# Patient Record
Sex: MALE | Race: WHITE | NOT HISPANIC OR LATINO | Employment: OTHER | URBAN - METROPOLITAN AREA
[De-identification: names, ages, dates, MRNs, and addresses within clinical notes are randomized per-mention and may not be internally consistent; named-entity substitution may affect disease eponyms.]

---

## 2022-04-11 ENCOUNTER — APPOINTMENT (OUTPATIENT)
Dept: CARDIOLOGY | Facility: CLINIC | Age: 72
End: 2022-04-11
Attending: INTERNAL MEDICINE
Payer: MEDICARE

## 2022-04-11 ENCOUNTER — HOSPITAL ENCOUNTER (OUTPATIENT)
Facility: CLINIC | Age: 72
Setting detail: OBSERVATION
Discharge: HOME | End: 2022-04-12
Attending: EMERGENCY MEDICINE | Admitting: INTERNAL MEDICINE
Payer: MEDICARE

## 2022-04-11 ENCOUNTER — APPOINTMENT (OUTPATIENT)
Dept: CT IMAGING | Facility: CLINIC | Age: 72
End: 2022-04-11
Attending: EMERGENCY MEDICINE
Payer: MEDICARE

## 2022-04-11 DIAGNOSIS — R55 SYNCOPE, UNSPECIFIED SYNCOPE TYPE: ICD-10-CM

## 2022-04-11 LAB
ALBUMIN SERPL-MCNC: 3.3 G/DL (ref 3.4–5)
ALBUMIN UR-MCNC: 20 MG/DL
ALP SERPL-CCNC: 73 U/L (ref 40–150)
ALT SERPL W P-5'-P-CCNC: 22 U/L (ref 0–70)
ANION GAP SERPL CALCULATED.3IONS-SCNC: 5 MMOL/L (ref 3–14)
APPEARANCE UR: CLEAR
AST SERPL W P-5'-P-CCNC: 22 U/L (ref 0–45)
BASOPHILS # BLD AUTO: 0.1 10E3/UL (ref 0–0.2)
BASOPHILS NFR BLD AUTO: 1 %
BILIRUB SERPL-MCNC: 0.6 MG/DL (ref 0.2–1.3)
BILIRUB UR QL STRIP: NEGATIVE
BUN SERPL-MCNC: 16 MG/DL (ref 7–30)
CALCIUM SERPL-MCNC: 8.4 MG/DL (ref 8.5–10.1)
CHLORIDE BLD-SCNC: 108 MMOL/L (ref 94–109)
CO2 SERPL-SCNC: 26 MMOL/L (ref 20–32)
COLOR UR AUTO: YELLOW
CREAT SERPL-MCNC: 1.16 MG/DL (ref 0.66–1.25)
EOSINOPHIL # BLD AUTO: 0.3 10E3/UL (ref 0–0.7)
EOSINOPHIL NFR BLD AUTO: 5 %
ERYTHROCYTE [DISTWIDTH] IN BLOOD BY AUTOMATED COUNT: 17.2 % (ref 10–15)
GFR SERPL CREATININE-BSD FRML MDRD: 67 ML/MIN/1.73M2
GLUCOSE BLD-MCNC: 147 MG/DL (ref 70–99)
GLUCOSE UR STRIP-MCNC: NEGATIVE MG/DL
HCT VFR BLD AUTO: 41.2 % (ref 40–53)
HGB BLD-MCNC: 12.4 G/DL (ref 13.3–17.7)
HGB UR QL STRIP: NEGATIVE
IMM GRANULOCYTES # BLD: 0.1 10E3/UL
IMM GRANULOCYTES NFR BLD: 1 %
INR PPP: 2.45 (ref 0.85–1.15)
KETONES UR STRIP-MCNC: NEGATIVE MG/DL
LEUKOCYTE ESTERASE UR QL STRIP: NEGATIVE
LVEF ECHO: NORMAL
LYMPHOCYTES # BLD AUTO: 0.6 10E3/UL (ref 0.8–5.3)
LYMPHOCYTES NFR BLD AUTO: 12 %
MAGNESIUM SERPL-MCNC: 2.3 MG/DL (ref 1.6–2.3)
MCH RBC QN AUTO: 24.8 PG (ref 26.5–33)
MCHC RBC AUTO-ENTMCNC: 30.1 G/DL (ref 31.5–36.5)
MCV RBC AUTO: 82 FL (ref 78–100)
MONOCYTES # BLD AUTO: 0.4 10E3/UL (ref 0–1.3)
MONOCYTES NFR BLD AUTO: 7 %
MUCOUS THREADS #/AREA URNS LPF: PRESENT /LPF
NEUTROPHILS # BLD AUTO: 3.9 10E3/UL (ref 1.6–8.3)
NEUTROPHILS NFR BLD AUTO: 74 %
NITRATE UR QL: NEGATIVE
NRBC # BLD AUTO: 0 10E3/UL
NRBC BLD AUTO-RTO: 0 /100
PH UR STRIP: 6 [PH] (ref 5–7)
PLATELET # BLD AUTO: 189 10E3/UL (ref 150–450)
POTASSIUM BLD-SCNC: 4.1 MMOL/L (ref 3.4–5.3)
PROT SERPL-MCNC: 6.2 G/DL (ref 6.8–8.8)
RBC # BLD AUTO: 5 10E6/UL (ref 4.4–5.9)
RBC URINE: 1 /HPF
SARS-COV-2 RNA RESP QL NAA+PROBE: NEGATIVE
SODIUM SERPL-SCNC: 139 MMOL/L (ref 133–144)
SP GR UR STRIP: 1.02 (ref 1–1.03)
SQUAMOUS EPITHELIAL: <1 /HPF
TROPONIN I SERPL HS-MCNC: 12 NG/L
UROBILINOGEN UR STRIP-MCNC: NORMAL MG/DL
WBC # BLD AUTO: 5.3 10E3/UL (ref 4–11)
WBC URINE: 2 /HPF

## 2022-04-11 PROCEDURE — 999N000208 ECHOCARDIOGRAM COMPLETE

## 2022-04-11 PROCEDURE — C9803 HOPD COVID-19 SPEC COLLECT: HCPCS

## 2022-04-11 PROCEDURE — 84484 ASSAY OF TROPONIN QUANT: CPT | Performed by: EMERGENCY MEDICINE

## 2022-04-11 PROCEDURE — 258N000003 HC RX IP 258 OP 636: Performed by: INTERNAL MEDICINE

## 2022-04-11 PROCEDURE — G0378 HOSPITAL OBSERVATION PER HR: HCPCS

## 2022-04-11 PROCEDURE — 99285 EMERGENCY DEPT VISIT HI MDM: CPT | Mod: 25

## 2022-04-11 PROCEDURE — U0003 INFECTIOUS AGENT DETECTION BY NUCLEIC ACID (DNA OR RNA); SEVERE ACUTE RESPIRATORY SYNDROME CORONAVIRUS 2 (SARS-COV-2) (CORONAVIRUS DISEASE [COVID-19]), AMPLIFIED PROBE TECHNIQUE, MAKING USE OF HIGH THROUGHPUT TECHNOLOGIES AS DESCRIBED BY CMS-2020-01-R: HCPCS | Performed by: EMERGENCY MEDICINE

## 2022-04-11 PROCEDURE — 250N000013 HC RX MED GY IP 250 OP 250 PS 637: Performed by: INTERNAL MEDICINE

## 2022-04-11 PROCEDURE — 96361 HYDRATE IV INFUSION ADD-ON: CPT

## 2022-04-11 PROCEDURE — 70450 CT HEAD/BRAIN W/O DYE: CPT

## 2022-04-11 PROCEDURE — 83735 ASSAY OF MAGNESIUM: CPT | Performed by: INTERNAL MEDICINE

## 2022-04-11 PROCEDURE — 93005 ELECTROCARDIOGRAM TRACING: CPT

## 2022-04-11 PROCEDURE — 96360 HYDRATION IV INFUSION INIT: CPT | Mod: 59

## 2022-04-11 PROCEDURE — 255N000002 HC RX 255 OP 636: Performed by: INTERNAL MEDICINE

## 2022-04-11 PROCEDURE — 80053 COMPREHEN METABOLIC PANEL: CPT | Performed by: EMERGENCY MEDICINE

## 2022-04-11 PROCEDURE — 81001 URINALYSIS AUTO W/SCOPE: CPT | Performed by: EMERGENCY MEDICINE

## 2022-04-11 PROCEDURE — 85025 COMPLETE CBC W/AUTO DIFF WBC: CPT | Performed by: EMERGENCY MEDICINE

## 2022-04-11 PROCEDURE — 93306 TTE W/DOPPLER COMPLETE: CPT | Mod: 26 | Performed by: INTERNAL MEDICINE

## 2022-04-11 PROCEDURE — 85610 PROTHROMBIN TIME: CPT | Performed by: EMERGENCY MEDICINE

## 2022-04-11 PROCEDURE — 99220 PR INITIAL OBSERVATION CARE,LEVEL III: CPT | Performed by: INTERNAL MEDICINE

## 2022-04-11 PROCEDURE — 93005 ELECTROCARDIOGRAM TRACING: CPT | Mod: 76

## 2022-04-11 PROCEDURE — 258N000003 HC RX IP 258 OP 636: Performed by: EMERGENCY MEDICINE

## 2022-04-11 PROCEDURE — 36415 COLL VENOUS BLD VENIPUNCTURE: CPT | Performed by: EMERGENCY MEDICINE

## 2022-04-11 RX ORDER — PROCHLORPERAZINE MALEATE 5 MG
5 TABLET ORAL EVERY 6 HOURS PRN
Status: DISCONTINUED | OUTPATIENT
Start: 2022-04-11 | End: 2022-04-12 | Stop reason: HOSPADM

## 2022-04-11 RX ORDER — ACETAMINOPHEN 650 MG/1
650 SUPPOSITORY RECTAL EVERY 4 HOURS PRN
Status: DISCONTINUED | OUTPATIENT
Start: 2022-04-11 | End: 2022-04-12 | Stop reason: HOSPADM

## 2022-04-11 RX ORDER — ACETAMINOPHEN 325 MG/1
650 TABLET ORAL EVERY 4 HOURS PRN
Status: DISCONTINUED | OUTPATIENT
Start: 2022-04-11 | End: 2022-04-12 | Stop reason: HOSPADM

## 2022-04-11 RX ORDER — NITROGLYCERIN 0.4 MG/1
0.4 TABLET SUBLINGUAL EVERY 5 MIN PRN
Status: DISCONTINUED | OUTPATIENT
Start: 2022-04-11 | End: 2022-04-12 | Stop reason: HOSPADM

## 2022-04-11 RX ORDER — LOSARTAN POTASSIUM 50 MG/1
50 TABLET ORAL DAILY
Status: DISCONTINUED | OUTPATIENT
Start: 2022-04-11 | End: 2022-04-12 | Stop reason: HOSPADM

## 2022-04-11 RX ORDER — WARFARIN SODIUM 5 MG/1
5 TABLET ORAL
Status: COMPLETED | OUTPATIENT
Start: 2022-04-11 | End: 2022-04-11

## 2022-04-11 RX ORDER — TAMSULOSIN HYDROCHLORIDE 0.4 MG/1
0.4 CAPSULE ORAL DAILY
Status: ON HOLD | COMMUNITY
End: 2022-04-12

## 2022-04-11 RX ORDER — LIDOCAINE 40 MG/G
CREAM TOPICAL
Status: DISCONTINUED | OUTPATIENT
Start: 2022-04-11 | End: 2022-04-12 | Stop reason: HOSPADM

## 2022-04-11 RX ORDER — AMLODIPINE BESYLATE 10 MG/1
10 TABLET ORAL DAILY
COMMUNITY
End: 2022-04-11

## 2022-04-11 RX ORDER — PROCHLORPERAZINE 25 MG
12.5 SUPPOSITORY, RECTAL RECTAL EVERY 12 HOURS PRN
Status: DISCONTINUED | OUTPATIENT
Start: 2022-04-11 | End: 2022-04-12 | Stop reason: HOSPADM

## 2022-04-11 RX ORDER — ALENDRONATE SODIUM 70 MG/1
70 TABLET ORAL
COMMUNITY
Start: 2021-10-03

## 2022-04-11 RX ORDER — ONDANSETRON 4 MG/1
4 TABLET, ORALLY DISINTEGRATING ORAL EVERY 6 HOURS PRN
Status: DISCONTINUED | OUTPATIENT
Start: 2022-04-11 | End: 2022-04-12 | Stop reason: HOSPADM

## 2022-04-11 RX ORDER — AMOXICILLIN 500 MG/1
CAPSULE ORAL
COMMUNITY
Start: 2021-09-17

## 2022-04-11 RX ORDER — ATORVASTATIN CALCIUM 10 MG/1
1 TABLET, FILM COATED ORAL DAILY
COMMUNITY
Start: 2020-09-15

## 2022-04-11 RX ORDER — TAMSULOSIN HYDROCHLORIDE 0.4 MG/1
0.4 CAPSULE ORAL EVERY EVENING
Status: DISCONTINUED | OUTPATIENT
Start: 2022-04-11 | End: 2022-04-12 | Stop reason: HOSPADM

## 2022-04-11 RX ORDER — ONDANSETRON 2 MG/ML
4 INJECTION INTRAMUSCULAR; INTRAVENOUS EVERY 6 HOURS PRN
Status: DISCONTINUED | OUTPATIENT
Start: 2022-04-11 | End: 2022-04-12 | Stop reason: HOSPADM

## 2022-04-11 RX ORDER — MEMANTINE HYDROCHLORIDE 10 MG/1
10 TABLET ORAL 2 TIMES DAILY
COMMUNITY
Start: 2021-04-19

## 2022-04-11 RX ADMIN — LOSARTAN POTASSIUM 50 MG: 50 TABLET, FILM COATED ORAL at 14:19

## 2022-04-11 RX ADMIN — SODIUM CHLORIDE 1000 ML: 9 INJECTION, SOLUTION INTRAVENOUS at 10:04

## 2022-04-11 RX ADMIN — HUMAN ALBUMIN MICROSPHERES AND PERFLUTREN 9 ML: 10; .22 INJECTION, SOLUTION INTRAVENOUS at 16:11

## 2022-04-11 RX ADMIN — TAMSULOSIN HYDROCHLORIDE 0.4 MG: 0.4 CAPSULE ORAL at 19:37

## 2022-04-11 RX ADMIN — WARFARIN SODIUM 5 MG: 5 TABLET ORAL at 18:34

## 2022-04-11 RX ADMIN — SODIUM CHLORIDE 500 ML: 9 INJECTION, SOLUTION INTRAVENOUS at 15:00

## 2022-04-11 NOTE — PHARMACY-ANTICOAGULATION SERVICE
Clinical Pharmacy - Warfarin Dosing Consult     Pharmacy has been consulted to manage this patient s warfarin therapy.  Indication: Mechanical Aortic Valve Replacement  Therapy Goal: INR 2-3  Warfarin Prior to Admission: Yes  Warfarin PTA Regimen: 7.5 mg Sun-Tu-Th  5 mg ROW    INR   Date Value Ref Range Status   04/11/2022 2.45 (H) 0.85 - 1.15 Final   06/05/2012 2.4 (H)  Final     Comment:     Reference Range                     0.9-1.1  Moderate-intensity Warfarin Therapy 2.0-3.0  Higher-intensity Warfarin Therapy   3.0-4.0           Recommend warfarin 5 mg today.  Pharmacy will monitor David Kinsey daily and order warfarin doses to achieve specified goal.      Please contact pharmacy as soon as possible if the warfarin needs to be held for a procedure or if the warfarin goals change.

## 2022-04-11 NOTE — PROVIDER NOTIFICATION
MD Notification    Notified Person: MD    Notified Person Name:  Emory Barriga     Notification Date/Time: 4/11/22 at 1521h     Notification Interaction: AMCOM     Purpose of Notification: marty ARREGUIN has no active code status on epic yet.     Orders Received: Full code     Comments:

## 2022-04-11 NOTE — ED TRIAGE NOTES
Pt presents by [facility] from home where he lives with wife. Pt reports this morning he was standing in the kitchen when he began to feel dizzy and had a syncopal episode. Wife reports this is very abnormal for the patient and he does not have falls. Pt states he did not hit his head/have LOC but wife believes that he did. Pt was able to get up but wife reports he did have 2 more falls after that. Pt does take coumadin for a valve replacement. Pt arrived and pt laying on the ground, vitals stable but pt pale and sweaty. Pt found to be in a mobitz type II heart block. Glucose 144

## 2022-04-11 NOTE — PHARMACY-ADMISSION MEDICATION HISTORY
Pharmacy Medication History  Admission medication history interview status for the 4/11/2022  admission is complete. See EPIC admission navigator for prior to admission medications     Location of Interview: Patient room  Medication history sources: Patient and called his wife.    Significant changes made to the medication list:  Removed Norvasc, Namenda is bid, updated coumadin regimen.    In the past week, patient estimated taking medication this percent of the time: greater than 90%    Additional medication history information:       Medication reconciliation completed by provider prior to medication history? No    Time spent in this activity: 15min    Prior to Admission medications    Medication Sig Last Dose Taking? Auth Provider   alendronate (FOSAMAX) 70 MG tablet Take 70 mg by mouth every 7 days 4/7/2022 Yes Reported, Patient   allopurinol (ZYLOPRIM) 300 MG tablet Take 300 mg by mouth daily. 4/10/2022 at Unknown time Yes Reported, Patient   amoxicillin (AMOXIL) 500 MG capsule Take 2000 mg prior to dental procedures,  Yes Reported, Patient   atorvastatin (LIPITOR) 10 MG tablet Take 1 tablet by mouth in the morning.  Yes Reported, Patient   losartan (COZAAR) 50 MG tablet Take 1 tablet by mouth daily. 4/10/2022 at Unknown time Yes Tony Pierre MD   memantine (NAMENDA) 10 MG tablet Take 10 mg by mouth in the morning and 10 mg in the evening. 4/10/2022 at Unknown time Yes Reported, Patient   traMADol (ULTRAM) 50 MG tablet Take 1-2 tablets by mouth every 8 hours as needed for pain.  Yes Tony Pierre MD   Warfarin Sodium (COUMADIN PO) Take 5-7.5 mg by mouth daily 7.5mg feliz, tu, thr and 5mg row. 4/10/2022 at Unknown time Yes Reported, Patient       The information provided in this note is only as accurate as the sources available at the time of update(s)

## 2022-04-11 NOTE — PLAN OF CARE
PRIMARY DIAGNOSIS: GENERALIZED WEAKNESS    OUTPATIENT/OBSERVATION GOALS TO BE MET BEFORE DISCHARGE  1. Orthostatic performed: Yes:          Lying Orthostatic BP: 152/83         Sitting Orthostatic BP: 119/61         Standing Orthostatic BP: 116/65     2. Tolerating PO medications: Yes    3. Return to near baseline physical activity: No    4. Cleared for discharge by consultants (if involved): No    Discharge Planner Nurse   Safe discharge environment identified: No  Barriers to discharge: Yes       Entered by: Stephanie Odell 04/11/2022 2:39 PM     Please review provider order for any additional goals.   Nurse to notify provider when observation goals have been met and patient is ready for discharge.Goal Outcome Evaluation:

## 2022-04-11 NOTE — PROVIDER NOTIFICATION
MD Notification    Notified Person: MD    Notified Person Name: Dr. Barriga     Notification Date/Time: 4/11/22 2:42pm     Notification Interaction: vocera page     Purpose of Notification: FYI Orthostatics positive. Please order PT consult if applicable.     Orders Received: fluid bolus 500/hr    Comments:

## 2022-04-11 NOTE — ED NOTES
Bed: ED07  Expected date: 4/11/22  Expected time: 9:21 AM  Means of arrival: Ambulance  Comments:  [place] 71m syncope ETA 0909

## 2022-04-11 NOTE — PLAN OF CARE
Goal Outcome Evaluation:    Orientation/Cognitive: A&O x4, hx dementia   Observation Goals (Met/ Not Met): Not met   Mobility Level/Assist Equipment: Ax1 gb and walker   Fall Risk (Y/N): Y  Behavior Concerns: N/A  Pain Management: pain free  Tele/VS/O2: VSS on RA , orthostatics positive   ABNL Lab/BG:   Diet: Reg no caffeine   Bowel/Bladder: due to void this shift   Skin Concerns: scabs on R shin covered in bandaid  Drains/Devices: IV fluid bolus 500/hr   Tests/Procedures for next shift: Echo  Anticipated DC date & active delays: 4/12/22 pending echo  Patient Stated Goal for Today: get food tray   Other : orthostatics positive, MD paged, fluid bolus in progress

## 2022-04-11 NOTE — H&P
"[facility]    History and Physical - Hospitalist Service       Date of Admission:  4/11/2022    Assessment & Plan   David Kinsey is a 71 year-old male with history of cognitive impairment, prostate cancer, history of aortic valve replacement on warfarin, hypertension, hyperlipidemia, gout who presents with recurrent syncope. Admitted on 4/11/2022.    Recurrent pre-syncope  Orthostatic hypotension   PACs with bigeminy  *Presents with multiple falls, preceded by light-headedness. Initial episode occurred after standing for 5-10 minutes while reaching for medications, he fell to his knees and each time he attempted to get up had a recurrent episode, 3-4 in total. His wife attended to him each time and reported he looked very pale and \"out of it\", but is not sure he ever lost complete consciousness   *EMS computer read in the field with Mobitz type II, though no clear evidence of this on the accompanying strips   *EKG with sinus rhythm with PACs in bigeminy pattern of unclear contribution   *Orthostatics positive in ED   *Recently started tamsulosin, though last dose 4/10 at 3 PM.  *Head CT negative  - Telemetry  - Echocardiogram  - Recheck orthostatics after IV fluids  - Add on magnesium  - Correlate any recurrent symptoms/syncope with telemetry findings. His PACs are currently asymptomatic.     History of aortic valve replacement on warfarin  - Continue warfarin with pharmacy to dose    Mild cognitive impairment   Currently alert and oriented.   - Hold memantine while observation status  - Re-orient as needed  - Maintain normal day/night, sleep wake cycles  - Minimize sedating/altering medications as able  - Treat separate conditions as detailed above/below     Hypertension  Amlodipine stopped a few months ago. Blood pressures typically well controlled at home  - Continue losartan for now. May consider dose reduction if low normal with tamsulosin started    Gout  - Continue prior to " admission allopurinol      Hyperlipidemia  - Hold prior to admission statin while observation status     Prostate cancer  BPH  - Recently prescribed tamsulosin by his urologist for urinary flow issues  - Continue tamsulosin for now, monitor orthostatics as above. If persistently positive or recurrent episodes on this will need to discontinue.        Diet: Regular Diet Adult    DVT Prophylaxis: Warfarin  De Leon Catheter: Not present  Code Status:   Full code    Disposition Plan   [facility] to observation status. Anticipate discharge within 24 hours if clinically improved.     Entered: Emory Barriga MD 04/11/2022, 12:38 PM     The patient's care was discussed with the patient and patient's wife     Clinically Significant Risk Factors Present on Admission          # Hypocalcemia: Ca = 8.4 mg/dL (Ref range: 8.5 - 10.1 mg/dL) and/or iCa = N/A on admission, will replace as needed    # Hypoalbuminemia: Albumin = 3.3 g/dL (Ref range: 3.4 - 5.0 g/dL) on admission, will monitor as appropriate   # Coagulation Defect: home medication list includes an anticoagulant medication             Emory Barriga MD  [facility]    ______________________________________________________________________    Chief Complaint   Syncope     History of Present Illness   David Kinsey is a 71 year-old male who presents with the above chief complaint.    History is obtained from the patient and discussion with Emergency Department provider. The patient reports he has recently been in his usual state of health. On the day of admission, he was 5-10 minutes into his morning routine when he felt light-headed and woozy. He initially fell to his knees before completely landing on the ground.  His wife tried to tell him to stay still and lie down, however he crawled over to a bench and pulled himself up, after which he again slumped over. He continued to try to stand up on his own as his wife was trying to call EMS,  ultimately being too weak to get up. His wife reports his face looked very pale and he was diaphoretic during these episodes.  She never witnessed him fully losing consciousness. He did not complain of any chest pain or shortness of breath associated with this.  She did see him strike his head at one point. He was just started on tamsulosin yesterday, with his first dose taken around 3 PM.  He was otherwise fine in the evening after taking his dose.  This was started by his urologist due to some urinary flow issues.  He reports he has been eating and drinking normally of late, denies any nausea, vomiting, diarrhea.  Other than the tamsulosin denies any new medication changes. His primary care had stopped his amlodipine a few months ago.      In the Emergency Department, the patient was seen by Dr. Cotto, with whom I discussed the patient's presenting symptoms and emergency department course.  Initial vital signs were aHR 58, /77, RR 12, SpO2 100% on room air. Work-up included head CT negative for acute intracranial abnormality, EKG with PACs in bigeminy pattern. Hospitalists were contacted for admission to the hospital.     Review of Systems   Complete 10 point review of systems assessed and negative except as noted in HPI.    Past Medical History    I have reviewed this patient's medical history and updated it with pertinent information if needed.   Past Medical History:   Diagnosis Date     BPH (benign prostatic hyperplasia)      Cognitive impairment      Gout      HTN (hypertension)      Hyperlipidemia      Prostate cancer (H)      S/P AVR        Past Surgical History   I have reviewed this patient's surgical history and updated it with pertinent information if needed.  Past Surgical History:   Procedure Laterality Date     REPLACE VALVE AORTIC       ROTATOR CUFF REPAIR RT/LT         Social History   Never smoker. Denies alcohol use. No illicit or IV drug use    Lives with his wife. Typically does not use  assistive device    Family History   I have reviewed this patient's family history and updated it with pertinent information if needed.   Family History   Problem Relation Age of Onset     Diabetes Mother      Alzheimer Disease Father        Prior to Admission Medications   Prior to Admission Medications   Prescriptions Last Dose Informant Patient Reported? Taking?   Warfarin Sodium (COUMADIN PO) 4/10/2022 at Unknown time  Yes Yes   Sig: Take 5-7.5 mg by mouth daily 7.5mg feliz, tu, thr and 5mg row.   alendronate (FOSAMAX) 70 MG tablet 4/7/2022  Yes Yes   Sig: Take 70 mg by mouth every 7 days   allopurinol (ZYLOPRIM) 300 MG tablet 4/10/2022 at Unknown time  Yes Yes   Sig: Take 300 mg by mouth daily.   amoxicillin (AMOXIL) 500 MG capsule   Yes Yes   Sig: Take 2000 mg prior to dental procedures,   atorvastatin (LIPITOR) 10 MG tablet   Yes Yes   Sig: Take 1 tablet by mouth in the morning.   losartan (COZAAR) 50 MG tablet 4/10/2022 at Unknown time  No Yes   Sig: Take 1 tablet by mouth daily.   memantine (NAMENDA) 10 MG tablet 4/10/2022 at Unknown time  Yes Yes   Sig: Take 10 mg by mouth in the morning and 10 mg in the evening.   tamsulosin (FLOMAX) 0.4 MG capsule 4/10/2022 at Unknown time  Yes Yes   Sig: Take 0.4 mg by mouth in the morning.   traMADol (ULTRAM) 50 MG tablet   No Yes   Sig: Take 1-2 tablets by mouth every 8 hours as needed for pain.      Facility-Administered Medications: None     Allergies   Allergies   Allergen Reactions     Hctz        Physical Exam   Vital Signs:     BP: 129/74 Pulse: 64   Resp: 15 SpO2: 99 % O2 Device: None (Room air)    Weight: 0 lbs 0 oz    Constitutional: Well-appearing, NAD  Eyes: PERRL, EOMI  HENT: Oropharynx clear, MMM  Respiratory: Clear to auscultation bilaterally, good air movement, normal effort   Cardiovascular: Regularly irregular. No peripheral edema.   GI: Soft, non-tender, non-distended. No rebound tenderness or guarding.   Skin: Warm, dry   Neurologic: Alert. Responding  to questions appropriately. Following commands. 5/5 upper and lower extremity strength symmetric bilaterally.   Psychiatric: Normal affect, appropriate      Data   Data reviewed today: I reviewed all medications, new labs and imaging results over the last 24 hours. I personally reviewed the EKG tracing showing sinus rhythm with PACs in bigeminy pattern.    Recent Labs   Lab 04/11/22  0959   WBC 5.3   HGB 12.4*   MCV 82      INR 2.45*      POTASSIUM 4.1   CHLORIDE 108   CO2 26   BUN 16   CR 1.16   ANIONGAP 5   GENIE 8.4*   *   ALBUMIN 3.3*   PROTTOTAL 6.2*   BILITOTAL 0.6   ALKPHOS 73   ALT 22   AST 22       Recent Results (from the past 24 hour(s))   CT Head w/o Contrast    Narrative    CT HEAD W/O CONTRAST 4/11/2022 10:17 AM    INDICATION: Trauma - Head Injury; syncope, fall, anticoagulated  TECHNIQUE: CT scan of the head without contrast. Dose reduction  techniques were used.  CONTRAST: None.  COMPARISON: None.    FINDINGS:   No intracranial hemorrhage, extraaxial collection, mass effect or CT  evidence of acute infarct.  Mild presumed chronic small vessel  ischemic changes. Moderate generalized volume loss. The ventricles are  proportional to the sulci. No skull fracture. No scalp hematoma.  Unremarkable orbits. Trace opacification in the ethmoid air cells.  Clear mastoid air cells.      Impression    IMPRESSION:  Age-related changes as above with no acute intracranial abnormality.    MAGDA PURCELL MD         SYSTEM ID:  [id number]

## 2022-04-11 NOTE — ED NOTES
[facility]  ED Nurse Handoff Report    ED Chief complaint: Syncope      ED Diagnosis:   Final diagnoses:   None       Code Status: Not addressed in ED    Allergies:   Allergies   Allergen Reactions     Hctz        Patient Story: Syncope, fall this today three times due to dizziness. Pt arrived in ED with new Mobitz type 2 heart block. On coumadin for valve replacement  Focused Assessment:    Labs Ordered and Resulted from Time of ED Arrival to Time of ED Departure   COMPREHENSIVE METABOLIC PANEL - Abnormal       Result Value    Sodium 139      Potassium 4.1      Chloride 108      Carbon Dioxide (CO2) 26      Anion Gap 5      Urea Nitrogen 16      Creatinine 1.16      Calcium 8.4 (*)     Glucose 147 (*)     Alkaline Phosphatase 73      AST 22      ALT 22      Protein Total 6.2 (*)     Albumin 3.3 (*)     Bilirubin Total 0.6      GFR Estimate 67     INR - Abnormal    INR 2.45 (*)    ROUTINE UA WITH MICROSCOPIC REFLEX TO CULTURE - Abnormal    Color Urine Yellow      Appearance Urine Clear      Glucose Urine Negative      Bilirubin Urine Negative      Ketones Urine Negative      Specific Gravity Urine 1.020      Blood Urine Negative      pH Urine 6.0      Protein Albumin Urine 20  (*)     Urobilinogen Urine Normal      Nitrite Urine Negative      Leukocyte Esterase Urine Negative      Mucus Urine Present (*)     RBC Urine 1      WBC Urine 2      Squamous Epithelials Urine <1     CBC WITH PLATELETS AND DIFFERENTIAL - Abnormal    WBC Count 5.3      RBC Count 5.00      Hemoglobin 12.4 (*)     Hematocrit 41.2      MCV 82      MCH 24.8 (*)     MCHC 30.1 (*)     RDW 17.2 (*)     Platelet Count 189      % Neutrophils 74      % Lymphocytes 12      % Monocytes 7      % Eosinophils 5      % Basophils 1      % Immature Granulocytes 1      NRBCs per 100 WBC 0      Absolute Neutrophils 3.9      Absolute Lymphocytes 0.6 (*)     Absolute Monocytes 0.4      Absolute Eosinophils 0.3      Absolute Basophils 0.1       Absolute Immature Granulocytes 0.1      Absolute NRBCs 0.0     TROPONIN I - Normal    Troponin I High Sensitivity 12       CT Head w/o Contrast   Final Result   IMPRESSION:   Age-related changes as above with no acute intracranial abnormality.      MAGDA PURCELL MD            SYSTEM ID:  [id number]            Treatments and/or interventions provided: NS bolus  Patient's response to treatments and/or interventions: no change    To be done/followed up on inpatient unit:  monitor    Does this patient have any cognitive concerns?: None    Activity level - Baseline/Home:  Independent  Activity Level - Current:   Stand with assistance    Patient's Preferred language: English   Needed?: No    Isolation: None  Infection: Not Applicable  Patient tested for COVID 19 prior to admission: YES  Bariatric?: No    Vital Signs:   Vitals:    04/11/22 0950 04/11/22 1005 04/11/22 1100 04/11/22 1200   BP:   129/74    Pulse: 57 60 60 64   Resp: 10 24 17 15   SpO2: 100% 100% 98% 99%       Cardiac Rhythm:     Was the PSS-3 completed:   Yes  What interventions are required if any?               Family Comments: wife at bedside  OBS brochure/video discussed/provided to patient/family: N/A              Name of person given brochure if not patient:               Relationship to patient:     For the majority of the shift this patient's behavior was Green.   Behavioral interventions performed were none.    ED NURSE PHONE NUMBER: *[id number]

## 2022-04-11 NOTE — ED PROVIDER NOTES
"  History   Chief Complaint:  Syncope    The history is provided by the patient. History limited by: Dementia - patient is a poor historian.      David Kinsey is a 71 year old male, on Warfarin, with history of hypertension, hyperlipidemia, and dementia, who presents after a fall earlier this morning. The patient felt well last night and when he woke up earlier today, notably asymptomatic and with no complaints. However as he was standing in his kitchen this morning, he experienced sudden dizziness while reaching for his daily pills, prompting him to fall onto the ground. The patient remembers falling but notes that \"I don't know what happened.\" He did not lose consciousness at any point and has full recollection of all events. His wife found him laying on the ground and called EMS, prompting his arrival at this time. He has no history of similar falls or dizziness. Presently, the patient feels well and has no pain, including any arthralgia or myalgia, chest pain, or headache. He has had no recent fever, cough, dysuria, or bloody stool.     Review of Systems   Constitutional: Negative for chills and fever.   Respiratory: Negative for cough and shortness of breath.    Cardiovascular: Negative for chest pain.   Gastrointestinal: Negative for vomiting.   Genitourinary: Negative for dysuria.   Neurological: Positive for syncope. Negative for headaches.   Psychiatric/Behavioral: Negative for confusion.   All other systems reviewed and are negative.      Allergies:  Hydrochlorothiazide  Chlorpheniramine-Phenylpropan    Medications:  Allopurinol  Amlodipine  Losartan  Niacin  Tramadol  Ascorbic acid  Omeprazole  Tamsulosin  Memantine  Warfarin    Past Medical History:     Gout  Hypertension  Prostate cancer  Systolic murmur  Mild cognitive impairment  Hypercholesterolemia  Heart valve replacement  Pneumonia  GERD  Colon polyp  Osteoporosis  Dementia  Skin cancer    Past Surgical History:    Replace valve " aortic  Rotator cuff repair, bilateral  Vasectomy  Closed treatment tibia  Hernia repair    Family History:    Colon cancer  Diabetes mellitus  Alzheimer's  CAD  Dementia  Hypertension  Hyperlipidemia  Diabetes mellitus    Social History:  The patient presented alone.  The patient arrived via EMS.  He is .    Physical Exam     Patient Vitals for the past 24 hrs:   BP Pulse Resp SpO2   04/11/22 1200 -- 64 15 99 %   04/11/22 1100 129/74 60 17 98 %   04/11/22 1005 -- 60 24 100 %   04/11/22 0950 -- 57 10 100 %   04/11/22 0935 109/77 58 12 100 %     Physical Exam  Constitutional: Well appearing.  HEENT: Atraumatic.  PERRL.  EOMI.  Moist mucous membranes.  Neck: Soft.  Supple.    Cardiac: Regular rate and rhythm.  No murmur or rub.  Respiratory: Clear to auscultation bilaterally.  No respiratory distress.  No wheezing, rhonchi, or rales.  Abdomen: Soft and nontender.    Nondistended.  Musculoskeletal: No edema.  Normal range of motion.  Neurologic: Alert and oriented x3.  Normal tone and bulk.  5/5 strength in bilateral upper and lower extremities.  Sensation to light touch intact throughout.   Skin: No rashes.  No edema.  Psych: Normal affect.  Normal behavior.      Emergency Department Course     ECG  ECG obtained at 0945, ECG read at 1210  Sinus rhythm with premature atrial complexes in a pattern of bigeminy.  Otherwise normal ECG.   New bigeminy as compared to prior, dated 8/5/7.  Rate 63 bpm. AL interval 166 ms. QRS duration 98 ms. QT/QTc 444/454 ms. P-R-T axes 70 64 59.     ECG taken at 1037, ECG read at 1210  Sinus rhythm with premature atrial complexes in a pattern of bigeminy.  Otherwise normal ECG.  No significant change as compared to prior EKG dated 4/11/21   Rate 63 bpm. AL interval 170 ms. QRS duration 94 ms. QT/QTc 444/454 ms. P-R-T axis 66 57 60.     Imaging:  CT Head w/o Contrast   Final Result   IMPRESSION:   Age-related changes as above with no acute intracranial abnormality.      MAGDA PURCELL,  MD            SYSTEM ID:  [id number]        Report per radiology    Laboratory:  Labs Ordered and Resulted from Time of ED Arrival to Time of ED Departure   COMPREHENSIVE METABOLIC PANEL - Abnormal       Result Value    Sodium 139      Potassium 4.1      Chloride 108      Carbon Dioxide (CO2) 26      Anion Gap 5      Urea Nitrogen 16      Creatinine 1.16      Calcium 8.4 (*)     Glucose 147 (*)     Alkaline Phosphatase 73      AST 22      ALT 22      Protein Total 6.2 (*)     Albumin 3.3 (*)     Bilirubin Total 0.6      GFR Estimate 67     INR - Abnormal    INR 2.45 (*)    ROUTINE UA WITH MICROSCOPIC REFLEX TO CULTURE - Abnormal    Color Urine Yellow      Appearance Urine Clear      Glucose Urine Negative      Bilirubin Urine Negative      Ketones Urine Negative      Specific Gravity Urine 1.020      Blood Urine Negative      pH Urine 6.0      Protein Albumin Urine 20  (*)     Urobilinogen Urine Normal      Nitrite Urine Negative      Leukocyte Esterase Urine Negative      Mucus Urine Present (*)     RBC Urine 1      WBC Urine 2      Squamous Epithelials Urine <1     CBC WITH PLATELETS AND DIFFERENTIAL - Abnormal    WBC Count 5.3      RBC Count 5.00      Hemoglobin 12.4 (*)     Hematocrit 41.2      MCV 82      MCH 24.8 (*)     MCHC 30.1 (*)     RDW 17.2 (*)     Platelet Count 189      % Neutrophils 74      % Lymphocytes 12      % Monocytes 7      % Eosinophils 5      % Basophils 1      % Immature Granulocytes 1      NRBCs per 100 WBC 0      Absolute Neutrophils 3.9      Absolute Lymphocytes 0.6 (*)     Absolute Monocytes 0.4      Absolute Eosinophils 0.3      Absolute Basophils 0.1      Absolute Immature Granulocytes 0.1      Absolute NRBCs 0.0     TROPONIN I - Normal    Troponin I High Sensitivity 12     COVID-19 VIRUS (CORONAVIRUS) BY PCR     Emergency Department Course:     Reviewed:  I reviewed nursing notes, vitals, past medical history and Care Everywhere.    Assessments:  0940 I obtained history and examined  the patient as noted above.   1201 I rechecked the patient and explained findings. I spoke with his wife and obtained additional history. I discussed plan for admission and all are in agreement with this.     Consults:  1223 I consulted with Dr. Barriga, hospitalist, regarding the patient's history and presentation here in the emergency department who accepted the patient for admission.    Interventions:  1004 NS 1 L IV    Disposition:  The patient was admitted to the hospital under the care of Dr. Barriga.     Impression & Plan     Medical Decision Making:  David Kinsey is a 71-year-old man who is afebrile and hemodynamically stable.  Neurologically intact.  Lab work-up as noted as above.  EKG demonstrates bigeminy and twelve-lead EKG from EMS was read as a computer is Mobitz type II AV block.  He has not demonstrated any type II AV block.  Given his symptoms and EKG changes, I discussed observation admission and he is in agreement.  I spoke with the hospitalist service who accepts him to the observation unit he is in stable condition at time of admission.      Diagnosis:    ICD-10-CM    1. Syncope, unspecified syncope type  R55      Scribe Disclosure:  I, Isabelevelin Vela, am serving as a scribe at 9:35 AM on 4/11/2022 to document services personally performed by Jeronimo Cotto MD based on my observations and the provider's statements to me.     Jeronimo Cotto MD  04/15/22 3060

## 2022-04-11 NOTE — PROVIDER NOTIFICATION
MD Notification    Notified Person: MD    Notified Person Name: Emory Barriga     Notification Date/Time: 04/11/22 at 1648h     Notification Interaction: Vocera     Purpose of Notification: FYI, orthostatic hpn is negative now after 500cc bolus.     Orders Received:    Comments:

## 2022-04-11 NOTE — PLAN OF CARE
Goal Outcome Evaluation:    RECEIVING UNIT ED HANDOFF REVIEW    ED Nurse Handoff Report was reviewed by: Stephanie Odell RN on April 11, 2022 at 12:55 PM

## 2022-04-12 ENCOUNTER — APPOINTMENT (OUTPATIENT)
Dept: CARDIOLOGY | Facility: CLINIC | Age: 72
End: 2022-04-12
Attending: PHYSICIAN ASSISTANT
Payer: MEDICARE

## 2022-04-12 VITALS
DIASTOLIC BLOOD PRESSURE: 74 MMHG | WEIGHT: 146.2 LBS | SYSTOLIC BLOOD PRESSURE: 142 MMHG | BODY MASS INDEX: 22.16 KG/M2 | HEIGHT: 68 IN | TEMPERATURE: 98.4 F | RESPIRATION RATE: 16 BRPM | HEART RATE: 63 BPM | OXYGEN SATURATION: 98 %

## 2022-04-12 LAB
ATRIAL RATE - MUSE: 63 BPM
ATRIAL RATE - MUSE: 63 BPM
DIASTOLIC BLOOD PRESSURE - MUSE: NORMAL MMHG
DIASTOLIC BLOOD PRESSURE - MUSE: NORMAL MMHG
INR PPP: 3.02 (ref 0.85–1.15)
INTERPRETATION ECG - MUSE: NORMAL
INTERPRETATION ECG - MUSE: NORMAL
P AXIS - MUSE: 66 DEGREES
P AXIS - MUSE: 70 DEGREES
PR INTERVAL - MUSE: 166 MS
PR INTERVAL - MUSE: 170 MS
QRS DURATION - MUSE: 94 MS
QRS DURATION - MUSE: 98 MS
QT - MUSE: 444 MS
QT - MUSE: 444 MS
QTC - MUSE: 454 MS
QTC - MUSE: 454 MS
R AXIS - MUSE: 57 DEGREES
R AXIS - MUSE: 64 DEGREES
SYSTOLIC BLOOD PRESSURE - MUSE: NORMAL MMHG
SYSTOLIC BLOOD PRESSURE - MUSE: NORMAL MMHG
T AXIS - MUSE: 59 DEGREES
T AXIS - MUSE: 60 DEGREES
VENTRICULAR RATE- MUSE: 63 BPM
VENTRICULAR RATE- MUSE: 63 BPM

## 2022-04-12 PROCEDURE — 36415 COLL VENOUS BLD VENIPUNCTURE: CPT | Performed by: INTERNAL MEDICINE

## 2022-04-12 PROCEDURE — 93244 EXT ECG>48HR<7D REV&INTERPJ: CPT | Performed by: INTERNAL MEDICINE

## 2022-04-12 PROCEDURE — 250N000013 HC RX MED GY IP 250 OP 250 PS 637: Performed by: INTERNAL MEDICINE

## 2022-04-12 PROCEDURE — 85610 PROTHROMBIN TIME: CPT | Performed by: INTERNAL MEDICINE

## 2022-04-12 PROCEDURE — G0378 HOSPITAL OBSERVATION PER HR: HCPCS

## 2022-04-12 PROCEDURE — 99217 PR OBSERVATION CARE DISCHARGE: CPT | Performed by: PHYSICIAN ASSISTANT

## 2022-04-12 PROCEDURE — 93242 EXT ECG>48HR<7D RECORDING: CPT

## 2022-04-12 RX ORDER — WARFARIN SODIUM 2.5 MG/1
2.5 TABLET ORAL
Status: DISCONTINUED | OUTPATIENT
Start: 2022-04-12 | End: 2022-04-12

## 2022-04-12 RX ORDER — WARFARIN SODIUM 2 MG/1
2 TABLET ORAL
Status: DISCONTINUED | OUTPATIENT
Start: 2022-04-12 | End: 2022-04-12 | Stop reason: HOSPADM

## 2022-04-12 RX ADMIN — LOSARTAN POTASSIUM 50 MG: 50 TABLET, FILM COATED ORAL at 07:59

## 2022-04-12 NOTE — PLAN OF CARE
Goal Outcome Evaluation:     Observation goals  PRIOR TO DISCHARGE        Comments: List all  goals to be met before discharge:   - Diagnostic tests and consults completed and resulted: met  - No further episodes of syncope and any new arrhythmia addressed with controlled heart rates: met, no c/o dizziness or light headedness, Orthos negative post bolus   - Vital signs normal or at patient baseline and orthostatic vitals are normal and patient not lightheaded with standing: met    - Tolerating oral intake to maintain hydration: met   - Safe disposition plan has been identified: unmet   - Nurse to notify provider when observation goals have been met and patient is ready for di discharge.

## 2022-04-12 NOTE — PLAN OF CARE
Goal Outcome Evaluation:    Plan of Care Reviewed With: patient     Overall Patient Progress: improving        Observation goals  PRIOR TO DISCHARGE          Comments: List all  goals to be met before discharge:     - Diagnostic tests and consults completed and resulted -Met    - No further episodes of syncope and any new arrhythmia addressed with controlled heart rates-Partially met    - Vital signs normal or at patient baseline and orthostatic vitals are normal and patient not lightheaded with standing-Met    - Tolerating oral intake to maintain hydration -Met    - Safe disposition plan has been identified -Met    - Nurse to notify provider when observation goals have been met and patient is ready for di discharge.        Orientation/Cognitive: A&OX4, forgetfu;l at times  Observation Goals (Met/ Not Met): partially met  Mobility Level/Assist Equipment: AX1GB/W  Fall Risk (Y/N): Y  Behavior Concerns: None  Pain Management: Denies any pain  Tele/VS/O2: SR/SB w PACs  ABNL Lab/BG: None this shift  Diet: Reg, no caffeine  Bowel/Bladder: Continent, urinary frequency  Skin Concerns: abrasion on rt shin covered with bandaide  Drains/Devices: PIV is s/l  Tests/Procedures for next shift: none  Anticipated DC date & active delays:4/12/22  Patient Stated Goal for Today: Ambulate, anticipate discharge

## 2022-04-12 NOTE — DISCHARGE SUMMARY
"[facility]    Discharge Summary  Hospitalist    Date of Admission:  4/11/2022  Date of Discharge:  4/12/2022  Discharging Provider: Peggy Monteiro PA-C    Discharge Diagnoses   Fall  recurrent pre syncope  Orthostatic hypotension  PACs with bigeminy   S/p aortic valve replacement on warfarin  Prostate cancer  BPH  Mild cognitive impairment  HLD  Gout  HTN    History of Present Illness   David Kinsey is an 71 year old male with history of cognitive impairment, prostate cancer, history of aortic valve replacement on warfarin, hypertension, hyperlipidemia, gout who presents with recurrent syncope. Admitted on 4/11/2022 to observation.      Per H&P:  \"On the day of admission, he was 5-10 minutes into his morning routine when he felt light-headed and woozy. He initially fell to his knees before completely landing on the ground.  His wife tried to tell him to stay still and lie down, however he crawled over to a bench and pulled himself up, after which he again slumped over. He continued to try to stand up on his own as his wife was trying to call EMS, ultimately being too weak to get up. His wife reports his face looked very pale and he was diaphoretic during these episodes.  She never witnessed him fully losing consciousness. He did not complain of any chest pain or shortness of breath associated with this.\"      Hospital Course   David Kinsey was admitted on 4/11/2022.  The following problems were addressed during his hospitalization:    Recurrent pre-syncope  Orthostatic hypotension   PACs with bigeminy  Presents with multiple falls, preceded by light-headedness. Initial episode occurred after standing for 5-10 minutes while reaching for medications, he fell to his knees and each time he attempted to get up had a recurrent episode, 3-4 in total. His wife attended to him each time and reported he looked very pale and \"out of it\", but is not sure he ever lost complete " consciousness   *EMS computer read in the field with Mobitz type II, though no clear evidence of this on the accompanying strips   *EKG with sinus rhythm with PACs in bigeminy pattern of unclear contribution   *Orthostatics positive in ED, improved after IVF but remain borderline   *Recently started tamsulosin a day prior to admission, though last dose 4/10 at 3 PM.  *Head CT negative  - Telemetry showing sinus rhythm with frequent PACs. Plan to discharge with outpatient cardiac monitor x 7 days  - Echocardiogram shows EF 55-60%, aortic valve prothesis as expected without other significant valvular pathology   - given concern for falls while on AC recommended discontinuing flomax and reaching out to primary urologist to look into alternative options with less side effects      History of aortic valve replacement on warfarin  ECHO stable.   - Continue warfarin with pharmacy to dose.      Mild cognitive impairment   Currently alert and oriented.   - resume memantine at discharge      Hypertension  Amlodipine stopped a few months ago. Blood pressures typically well controlled at home  - Continue losartan for now.   - advised to follow home BP and discuss with PCP at follow up      Gout  - Continue prior to admission allopurinol       Hyperlipidemia  - Hold prior to admission statin while observation status      Prostate cancer  BPH  - Recently prescribed tamsulosin by his urologist for urinary flow issues. In light of fall risk and persistent borderline orthostatic vitals this was stopped. Advised to call Urologist for alternative options    Patient was discussed with Dr. Martinez who agrees with the above plan      Peggy Monteiro PA-C, KIRAN    Significant Results and Procedures   See below     Code Status   Full Code       Primary Care Physician   Tony Pierre    Physical Exam   Temp: 98.4  F (36.9  C) Temp src: Oral BP: (!) 142/74 Pulse: 63   Resp: 16 SpO2: 98 % O2 Device: None (Room air)    Vitals:     04/11/22 1406   Weight: 66.3 kg (146 lb 3.2 oz)     Vital Signs with Ranges  Temp:  [97.5  F (36.4  C)-98.4  F (36.9  C)] 98.4  F (36.9  C)  Pulse:  [50-63] 63  Resp:  [14-18] 16  BP: (141-148)/(73-83) 142/74  SpO2:  [95 %-100 %] 98 %  I/O last 3 completed shifts:  In: 240 [P.O.:240]  Out: -     Constitutional: Alert and oriented, sitting up in chair. Appears comfortable and is appropriately conversant   ENT:  moist mucous membranes  Eyes:  Sclera anicteric, EOMI  Respiratory: Lungs clear to auscultation bilaterally, no increased work of breathing  Cardiovascular: regularly irregular with regular rate. No LE edema   GI:  active bowel sounds, abdomen soft, non-tender  MSK:  Moves all four extremities. Normal tone  Neuro:  Speech is clear. Face symmetric. CN 2-12 grossly intact.     Discharge Disposition   Discharged to home  Condition at discharge: Stable    Consultations This Hospital Stay   PHARMACY TO DOSE WARFARIN    Time Spent on this Encounter   I, Peggy Monteiro PA-C, personally saw the patient today and spent greater than 30 minutes discharging this patient.    Discharge Orders      Reason for your hospital stay    You were here for evaluation after falling     Follow-up and recommended labs and tests     Follow up with primary care provider within 7 days for hospital follow up. Keep a log of your blood pressures at home to discuss. Discuss potential alternatives to flomax. You can also give your Urologist a call to let them know what happened and see if they have any alternative medications to suggest  Review results of your heart monitor with primary care provider     Activity    Your activity upon discharge: activity as tolerated.     Leadless EKG Monitor 3 to 14 Days    Please send results to primary care provider     Diet    Follow this diet upon discharge: Orders Placed This Encounter      Combination Diet Regular Diet Adult. Drink plenty of fluids to stay hydrated     Discharge Medications    Current Discharge Medication List      CONTINUE these medications which have NOT CHANGED    Details   alendronate (FOSAMAX) 70 MG tablet Take 70 mg by mouth every 7 days      allopurinol (ZYLOPRIM) 300 MG tablet Take 300 mg by mouth daily.    Associated Diagnoses: ABSTRACTING RESULTS; S/P AVR; HTN (hypertension); Gout; Annual physical exam      amoxicillin (AMOXIL) 500 MG capsule Take 2000 mg prior to dental procedures,      atorvastatin (LIPITOR) 10 MG tablet Take 1 tablet by mouth in the morning.      losartan (COZAAR) 50 MG tablet Take 1 tablet by mouth daily.  Qty: 90 tablet, Refills: 3    Associated Diagnoses: HTN (hypertension)      memantine (NAMENDA) 10 MG tablet Take 10 mg by mouth in the morning and 10 mg in the evening.      traMADol (ULTRAM) 50 MG tablet Take 1-2 tablets by mouth every 8 hours as needed for pain.  Qty: 100 tablet, Refills: 0    Associated Diagnoses: Back pain      Warfarin Sodium (COUMADIN PO) Take 5-7.5 mg by mouth daily 7.5mg feliz, tu, thr and 5mg row.    Associated Diagnoses: ABSTRACTING RESULTS; S/P AVR; HTN (hypertension); Gout; Annual physical exam         STOP taking these medications       tamsulosin (FLOMAX) 0.4 MG capsule Comments:   Reason for Stopping:             Allergies   Allergies   Allergen Reactions     Hctz      Data   Most Recent 3 CBC's:Recent Labs   Lab Test 04/11/22  0959   WBC 5.3   HGB 12.4*   MCV 82         Most Recent 3 BMP's:  Recent Labs   Lab Test 04/11/22  0959      POTASSIUM 4.1   CHLORIDE 108   CO2 26   BUN 16   CR 1.16   ANIONGAP 5   GENIE 8.4*   *     Most Recent 2 LFT's:  Recent Labs   Lab Test 04/11/22  0959   AST 22   ALT 22   ALKPHOS 73   BILITOTAL 0.6     Most Recent INR's and Anticoagulation Dosing History:  Anticoagulation Dose History     Recent Dosing and Labs Latest Ref Rng & Units 12/13/2011 1/17/2012 3/13/2012 4/10/2012 6/5/2012 4/11/2022 4/12/2022    Warfarin 5 mg - - - - - - 5 mg -    INR 0.85 - 1.15 2.6 2.6 2.1 2.7  2.4(H) 2.45(H) 3.02(H)        Most Recent 3 Troponin's:No lab results found.  Most Recent Cholesterol Panel:No lab results found.  Most Recent 6 Bacteria Isolates From Any Culture (See EPIC Reports for Culture Details):No lab results found.  Most Recent TSH, T4 and A1c Labs:No lab results found.  Results for orders placed or performed during the hospital encounter of 22   CT Head w/o Contrast    Narrative    CT HEAD W/O CONTRAST 2022 10:17 AM    INDICATION: Trauma - Head Injury; syncope, fall, anticoagulated  TECHNIQUE: CT scan of the head without contrast. Dose reduction  techniques were used.  CONTRAST: None.  COMPARISON: None.    FINDINGS:   No intracranial hemorrhage, extraaxial collection, mass effect or CT  evidence of acute infarct.  Mild presumed chronic small vessel  ischemic changes. Moderate generalized volume loss. The ventricles are  proportional to the sulci. No skull fracture. No scalp hematoma.  Unremarkable orbits. Trace opacification in the ethmoid air cells.  Clear mastoid air cells.      Impression    IMPRESSION:  Age-related changes as above with no acute intracranial abnormality.    MAGDA PURCELL MD         SYSTEM ID:  [id number]   Echocardiogram Complete     Value    LVEF  55-60%    Narrative    [id number]  [id number]  [id number]  [id number]^CHAMP^JOVANY^PAULO     [facility]  Echocardiography Laboratory  [address] [place] [address]     Name: JOSEFINA LAKE  MRN: [id number]  : 1950  Study Date: 2022 03:27 PM  Age: 71 yrs  Gender: Male  Patient Location: [facility]  Reason For Study: Syncope  Ordering Physician: JOVANY OAKES  Referring Physician: JOVANY OAKES  Performed By: Karri Harding RDCS     BSA: 1.8 m2  Height: 69 in  Weight: 150 lb  HR: 80  BP: 129/74 mmHg  ______________________________________________________________________________  Procedure  Complete Portable Echo Adult. [employer]ison (NDC #[id number]) given  intravenously.  ______________________________________________________________________________  Interpretation Summary     1. The left ventricle is normal in size. There is normal left ventricular wall  thickness. Left ventricular systolic function is normal. The visual ejection  fraction is 55-60%. Diastolic function not assessed due to arrhythmia. Septal  motion is consistent with post-operative state. There is no thrombus seen in  the left ventricle.  2. The right ventricle is normal size. The right ventricular systolic function  is normal.  3. Trace mitral and tricuspid regurgitation.  4. There is a mechanical aortic valve. (St.Amado prosthesis implanted in 1981).  The mean AoV pressure gradient is 12.0 mmHg. The peak AoV pressure gradient is  27.0 mmHg. These values are within normal limits for this type of prosthesis.  There is trace aortic regurgitation.  5. No pericardial effusion.  6. No previous study for comparison.  ______________________________________________________________________________  Left Ventricle  The left ventricle is normal in size. There is normal left ventricular wall  thickness. Left ventricular systolic function is normal. The visual ejection  fraction is 55-60%. Diastolic function not assessed due to arrhythmia. Septal  motion is consistent with post-operative state. There is no thrombus seen in  the left ventricle.     Right Ventricle  The right ventricle is normal size. The right ventricular systolic function is  normal.     Atria  Normal left atrial size. Right atrial size is normal. There is no color  Doppler evidence of an atrial shunt.     Mitral Valve  There is trace mitral regurgitation.     Tricuspid Valve  There is trace tricuspid regurgitation. The right ventricular systolic  pressure is approximated at 24.4 mmHg plus the right atrial pressure.     Aortic Valve  There is a mechanical aortic valve. (St.Amado prosthesis implanted in 1981).  The mean AoV pressure gradient is  12.0 mmHg. The peak AoV pressure gradient is  27.0 mmHg. These values are within normal limits for this type of prosthesis.  There is trace aortic regurgitation.     Pulmonic Valve  There is no pulmonic valvular stenosis.     Vessels  Normal size ascending aorta. Dilation of the inferior vena cava is present  with normal respiratory variation in diameter.     Pericardium  There is no pericardial effusion.     Rhythm  The rhythm was undetermined.  ______________________________________________________________________________  MMode/2D Measurements & Calculations  IVSd: 1.0 cm     LVIDd: 4.6 cm  LVIDs: 2.6 cm  LVPWd: 0.85 cm  FS: 42.8 %  LV mass(C)d: 147.2 grams  LV mass(C)dI: 80.5 grams/m2  asc Aorta Diam: 3.1 cm  LVOT diam: 2.4 cm  LVOT area: 4.4 cm2  LA Volume (BP): 52.0 ml  LA Volume Index (BP): 28.4 ml/m2  RWT: 0.37     Doppler Measurements & Calculations  Ao V2 max: 257.9 cm/sec  Ao max P.0 mmHg  Ao V2 mean: 152.9 cm/sec  Ao mean P.0 mmHg  Ao V2 VTI: 40.9 cm  DEANDRA(I,D): 2.5 cm2  DEANDRA(V,D): 2.2 cm2  LV V1 max P.1 mmHg  LV V1 max: 132.2 cm/sec  LV V1 VTI: 23.6 cm  SV(LVOT): 103.4 ml  SI(LVOT): 56.6 ml/m2  PA acc time: 0.10 sec  TR max benedicto: 246.8 cm/sec  TR max P.4 mmHg  AV Benedicto Ratio (DI): 0.51  DEANDRA Index (cm2/m2): 1.4     ______________________________________________________________________________  Report approved by: Shwetha Chan 2022 04:20 PM

## 2022-04-12 NOTE — PLAN OF CARE
Orientation/Cognitive: A/Ox4  Observation Goals (Met/ Not Met): in progress  Mobility Level/Assist Equipment: Ax1 w/ Gb and walker   Fall Risk (Y/N): Yes  Behavior Concerns: None   Pain Management: Denies pain   Tele/VS/O2: VSS on RA, SR w/ PACs, Orthos negative p 500cc bolus   ABNL Lab/BG: none  Diet: Regular s caffeine   Bowel/Bladder: continent, voids freely to BR  Skin Concerns: Scabs on R shin-band aid in place, CDI  Drains/Devices:  Tele, PIV L AC  Tests/Procedures for next shift: INR  Anticipated DC date & active delays: 1-2 days   Patient Stated Goal for Today: none stated

## 2022-04-12 NOTE — PROGRESS NOTES
PRIOR TO DISCHARGE        Comments: List all  goals to be met before discharge: met  - Diagnostic tests and consults completed and resulted met  - No further episodes of syncope and any new arrhythmia addressed with controlled heart rates met  - Vital signs normal or at patient baseline and orthostatic vitals are normal and patient not lightheaded with standing partially met  - Tolerating oral intake to maintain hydration met  - Safe disposition plan has been identified met  - Nurse to notify provider when observation goals have been met and patient is ready for di discharge.

## 2022-04-12 NOTE — PLAN OF CARE
Goal Outcome Evaluation:    Plan of Care Reviewed With: patient     Overall Patient Progress: improving        Observation goals  PRIOR TO DISCHARGE          Comments: List all  goals to be met before discharge:     - Diagnostic tests and consults completed and resulted -Met    - No further episodes of syncope and any new arrhythmia addressed with controlled heart rates-Partially met    - Vital signs normal or at patient baseline and orthostatic vitals are normal and patient not lightheaded with standing-Met    - Tolerating oral intake to maintain hydration -Met    - Safe disposition plan has been identified -Met    - Nurse to notify provider when observation goals have been met and patient is ready for di discharge.

## 2022-04-15 ASSESSMENT — ENCOUNTER SYMPTOMS
HEADACHES: 0
DYSURIA: 0
CONFUSION: 0
COUGH: 0
SHORTNESS OF BREATH: 0
CHILLS: 0
VOMITING: 0
FEVER: 0

## 2023-10-20 ENCOUNTER — APPOINTMENT (OUTPATIENT)
Dept: GENERAL RADIOLOGY | Facility: CLINIC | Age: 73
End: 2023-10-20
Attending: EMERGENCY MEDICINE
Payer: MEDICARE

## 2023-10-20 ENCOUNTER — HOSPITAL ENCOUNTER (EMERGENCY)
Facility: CLINIC | Age: 73
Discharge: LEFT BEFORE DISCHARGE | End: 2023-10-20
Admitting: EMERGENCY MEDICINE
Payer: MEDICARE

## 2023-10-20 VITALS
RESPIRATION RATE: 20 BRPM | HEART RATE: 52 BPM | DIASTOLIC BLOOD PRESSURE: 71 MMHG | TEMPERATURE: 97.8 F | SYSTOLIC BLOOD PRESSURE: 166 MMHG | OXYGEN SATURATION: 95 %

## 2023-10-20 PROCEDURE — 73030 X-RAY EXAM OF SHOULDER: CPT | Mod: RT

## 2023-10-20 PROCEDURE — 99281 EMR DPT VST MAYX REQ PHY/QHP: CPT

## 2023-10-20 NOTE — ED TRIAGE NOTES
Coming from home, hx of parkinson's, started shaking and fell, landing on shoulders and back. Skin tear on right forearm. VSS.

## 2023-10-20 NOTE — ED NOTES
PIT/Triage Evaluation    Patient presented with a fall.  Patient was shaking from his Parkinson's and fell onto his right shoulder and back.  Patient is not complaining of any pain.  He has full range of motion of his shoulders.  Did not hit head.  No loss of consciousness.    Exam is notable for:  General: No distress  Abdomen: Soft, non-tender  Respiratory: No tachypnea  Cardiovascular: Equal pulses, brisk cap refill  Extremities: Moving all extremities      Appropriate interventions for symptom management were initiated if applicable.  Appropriate diagnostic tests were initiated if indicated.    Important information for subsequent clinician:  Fell on right shoulder and upper back.  No complaints of pain.  He is on blood thinners.  Did not hit head. Xray ordered.    I briefly evaluated the patient and developed an initial plan of care. I discussed this plan and explained that this brief interaction does not constitute a full evaluation. Patient/family understands that they should wait to be fully evaluated and discuss any test results with another clinician prior to leaving the hospital.       Ivon Zaldivar MD  10/20/23 1317

## 2024-04-18 ENCOUNTER — APPOINTMENT (OUTPATIENT)
Dept: CT IMAGING | Facility: CLINIC | Age: 74
End: 2024-04-18
Attending: EMERGENCY MEDICINE
Payer: MEDICARE

## 2024-04-18 ENCOUNTER — HOSPITAL ENCOUNTER (EMERGENCY)
Facility: CLINIC | Age: 74
Discharge: HOME | End: 2024-04-18
Attending: EMERGENCY MEDICINE | Admitting: EMERGENCY MEDICINE
Payer: MEDICARE

## 2024-04-18 ENCOUNTER — APPOINTMENT (OUTPATIENT)
Dept: GENERAL RADIOLOGY | Facility: CLINIC | Age: 74
End: 2024-04-18
Attending: EMERGENCY MEDICINE
Payer: MEDICARE

## 2024-04-18 VITALS
HEIGHT: 69 IN | OXYGEN SATURATION: 99 % | WEIGHT: 125 LBS | RESPIRATION RATE: 12 BRPM | BODY MASS INDEX: 18.51 KG/M2 | DIASTOLIC BLOOD PRESSURE: 81 MMHG | HEART RATE: 62 BPM | SYSTOLIC BLOOD PRESSURE: 130 MMHG

## 2024-04-18 DIAGNOSIS — S01.81XA FACIAL LACERATION, INITIAL ENCOUNTER: ICD-10-CM

## 2024-04-18 DIAGNOSIS — G20.B1 PARKINSON'S DISEASE WITH DYSKINESIA, UNSPECIFIED WHETHER MANIFESTATIONS FLUCTUATE (H): ICD-10-CM

## 2024-04-18 DIAGNOSIS — S05.12XA CONTUSION OF LEFT ORBITAL TISSUES, INITIAL ENCOUNTER: ICD-10-CM

## 2024-04-18 DIAGNOSIS — S06.0X0A CLOSED HEAD INJURY WITH CONCUSSION, WITHOUT LOSS OF CONSCIOUSNESS, INITIAL ENCOUNTER: ICD-10-CM

## 2024-04-18 DIAGNOSIS — Z79.01 ANTICOAGULATED ON COUMADIN: ICD-10-CM

## 2024-04-18 LAB
ANION GAP SERPL CALCULATED.3IONS-SCNC: 7 MMOL/L (ref 7–15)
ATRIAL RATE - MUSE: 57 BPM
BASOPHILS # BLD AUTO: 0 10E3/UL (ref 0–0.2)
BASOPHILS NFR BLD AUTO: 1 %
BUN SERPL-MCNC: 19.5 MG/DL (ref 8–23)
CALCIUM SERPL-MCNC: 8.9 MG/DL (ref 8.8–10.2)
CHLORIDE SERPL-SCNC: 105 MMOL/L (ref 98–107)
CREAT SERPL-MCNC: 1.15 MG/DL (ref 0.67–1.17)
DEPRECATED HCO3 PLAS-SCNC: 28 MMOL/L (ref 22–29)
DIASTOLIC BLOOD PRESSURE - MUSE: NORMAL MMHG
EGFRCR SERPLBLD CKD-EPI 2021: 67 ML/MIN/1.73M2
EOSINOPHIL # BLD AUTO: 0.3 10E3/UL (ref 0–0.7)
EOSINOPHIL NFR BLD AUTO: 4 %
ERYTHROCYTE [DISTWIDTH] IN BLOOD BY AUTOMATED COUNT: 13.2 % (ref 10–15)
GLUCOSE SERPL-MCNC: 99 MG/DL (ref 70–99)
HCT VFR BLD AUTO: 41.8 % (ref 40–53)
HGB BLD-MCNC: 13.9 G/DL (ref 13.3–17.7)
IMM GRANULOCYTES # BLD: 0 10E3/UL
IMM GRANULOCYTES NFR BLD: 0 %
INR PPP: 2.05 (ref 0.85–1.15)
INTERPRETATION ECG - MUSE: NORMAL
LYMPHOCYTES # BLD AUTO: 0.6 10E3/UL (ref 0.8–5.3)
LYMPHOCYTES NFR BLD AUTO: 8 %
MCH RBC QN AUTO: 29.8 PG (ref 26.5–33)
MCHC RBC AUTO-ENTMCNC: 33.3 G/DL (ref 31.5–36.5)
MCV RBC AUTO: 90 FL (ref 78–100)
MONOCYTES # BLD AUTO: 0.5 10E3/UL (ref 0–1.3)
MONOCYTES NFR BLD AUTO: 8 %
NEUTROPHILS # BLD AUTO: 5.6 10E3/UL (ref 1.6–8.3)
NEUTROPHILS NFR BLD AUTO: 79 %
NRBC # BLD AUTO: 0 10E3/UL
NRBC BLD AUTO-RTO: 0 /100
P AXIS - MUSE: 69 DEGREES
PLATELET # BLD AUTO: 155 10E3/UL (ref 150–450)
POTASSIUM SERPL-SCNC: 4.3 MMOL/L (ref 3.4–5.3)
PR INTERVAL - MUSE: 184 MS
QRS DURATION - MUSE: 98 MS
QT - MUSE: 464 MS
QTC - MUSE: 451 MS
R AXIS - MUSE: 72 DEGREES
RBC # BLD AUTO: 4.67 10E6/UL (ref 4.4–5.9)
SODIUM SERPL-SCNC: 140 MMOL/L (ref 135–145)
SYSTOLIC BLOOD PRESSURE - MUSE: NORMAL MMHG
T AXIS - MUSE: 71 DEGREES
VENTRICULAR RATE- MUSE: 57 BPM
WBC # BLD AUTO: 7 10E3/UL (ref 4–11)

## 2024-04-18 PROCEDURE — 36415 COLL VENOUS BLD VENIPUNCTURE: CPT | Performed by: EMERGENCY MEDICINE

## 2024-04-18 PROCEDURE — 250N000011 HC RX IP 250 OP 636: Performed by: EMERGENCY MEDICINE

## 2024-04-18 PROCEDURE — 85004 AUTOMATED DIFF WBC COUNT: CPT | Performed by: EMERGENCY MEDICINE

## 2024-04-18 PROCEDURE — 90715 TDAP VACCINE 7 YRS/> IM: CPT | Performed by: EMERGENCY MEDICINE

## 2024-04-18 PROCEDURE — 12052 INTMD RPR FACE/MM 2.6-5.0 CM: CPT

## 2024-04-18 PROCEDURE — 250N000013 HC RX MED GY IP 250 OP 250 PS 637: Performed by: EMERGENCY MEDICINE

## 2024-04-18 PROCEDURE — 96361 HYDRATE IV INFUSION ADD-ON: CPT

## 2024-04-18 PROCEDURE — 90471 IMMUNIZATION ADMIN: CPT | Performed by: EMERGENCY MEDICINE

## 2024-04-18 PROCEDURE — 82374 ASSAY BLOOD CARBON DIOXIDE: CPT | Performed by: EMERGENCY MEDICINE

## 2024-04-18 PROCEDURE — 82565 ASSAY OF CREATININE: CPT | Performed by: EMERGENCY MEDICINE

## 2024-04-18 PROCEDURE — 12011 RPR F/E/E/N/L/M 2.5 CM/<: CPT

## 2024-04-18 PROCEDURE — 70450 CT HEAD/BRAIN W/O DYE: CPT | Mod: MA

## 2024-04-18 PROCEDURE — 99285 EMERGENCY DEPT VISIT HI MDM: CPT | Mod: 25

## 2024-04-18 PROCEDURE — 73130 X-RAY EXAM OF HAND: CPT | Mod: RT

## 2024-04-18 PROCEDURE — 70480 CT ORBIT/EAR/FOSSA W/O DYE: CPT | Mod: MA

## 2024-04-18 PROCEDURE — 85610 PROTHROMBIN TIME: CPT | Performed by: EMERGENCY MEDICINE

## 2024-04-18 PROCEDURE — 258N000003 HC RX IP 258 OP 636: Performed by: EMERGENCY MEDICINE

## 2024-04-18 PROCEDURE — 93005 ELECTROCARDIOGRAM TRACING: CPT | Mod: RTG

## 2024-04-18 PROCEDURE — 96360 HYDRATION IV INFUSION INIT: CPT

## 2024-04-18 RX ORDER — ACETAMINOPHEN 325 MG/1
650 TABLET ORAL ONCE
Status: COMPLETED | OUTPATIENT
Start: 2024-04-18 | End: 2024-04-18

## 2024-04-18 RX ADMIN — SODIUM CHLORIDE 1000 ML: 9 INJECTION, SOLUTION INTRAVENOUS at 08:51

## 2024-04-18 RX ADMIN — ACETAMINOPHEN 650 MG: 325 TABLET, FILM COATED ORAL at 08:48

## 2024-04-18 RX ADMIN — CLOSTRIDIUM TETANI TOXOID ANTIGEN (FORMALDEHYDE INACTIVATED), CORYNEBACTERIUM DIPHTHERIAE TOXOID ANTIGEN (FORMALDEHYDE INACTIVATED), BORDETELLA PERTUSSIS TOXOID ANTIGEN (GLUTARALDEHYDE INACTIVATED), BORDETELLA PERTUSSIS FILAMENTOUS HEMAGGLUTININ ANTIGEN (FORMALDEHYDE INACTIVATED), BORDETELLA PERTUSSIS PERTACTIN ANTIGEN, AND BORDETELLA PERTUSSIS FIMBRIAE 2/3 ANTIGEN 0.5 ML: 5; 2; 2.5; 5; 3; 5 INJECTION, SUSPENSION INTRAMUSCULAR at 09:14

## 2024-04-18 ASSESSMENT — ACTIVITIES OF DAILY LIVING (ADL)
ADLS_ACUITY_SCORE: 38

## 2024-04-18 ASSESSMENT — COLUMBIA-SUICIDE SEVERITY RATING SCALE - C-SSRS
2. HAVE YOU ACTUALLY HAD ANY THOUGHTS OF KILLING YOURSELF IN THE PAST MONTH?: NO
1. IN THE PAST MONTH, HAVE YOU WISHED YOU WERE DEAD OR WISHED YOU COULD GO TO SLEEP AND NOT WAKE UP?: NO
6. HAVE YOU EVER DONE ANYTHING, STARTED TO DO ANYTHING, OR PREPARED TO DO ANYTHING TO END YOUR LIFE?: NO

## 2024-04-18 NOTE — ED PROVIDER NOTES
"  History     Chief Complaint:  Fall       The history is provided by the patient and the spouse. The history is limited by the condition of the patient (Dementia).      David Kinsey is a 73 year old male, anticoagulated on Coumadin, with a history of Lewy Body Dementia who presents with a fall. Earlier this morning, he was taking a shower when he suddenly became dizzy and fell to the floor. He did not lose consciousness at that moment, but did hit his head. He is unsure what he hit his head on, but his wife suspects that it was likely floor since it had blood on it. He denies recent fever, cough, vomiting or cold symptoms.He further denies visual disturbance, chest pain, or abdominal pain.     Independent Historian:   Hx supplemented by the mother    Review of External Notes:   None    Medications:    Alendronate   Allopurinol   Amoxicillin   Atorvastatin   Losartan   Memantine   Tramadol    Warfarin Sodium     Past Medical History:    BPH   Cognitive impaitment  Gout  HTN   HLD   Prostate cancer   Mild dementia   Systolic murmur   Hypercholesteremia   GERD   Colon polyp osteoporosis     Past Surgical History:    Aortic valve replacement   Rotator cuff repair   Vasectomy     Physical Exam   Patient Vitals for the past 24 hrs:   BP Pulse Resp SpO2 Height Weight   04/18/24 1100 130/81 62 12 99 % -- --   04/18/24 1030 (!) 144/71 53 15 99 % -- --   04/18/24 0918 (!) 164/95 54 19 96 % -- --   04/18/24 0827 (!) 154/72 56 18 99 % 1.753 m (5' 9\") 56.7 kg (125 lb)        Physical Exam  Nursing note and vitals reviewed.  Constitutional:  Appears well-developed and well-nourished.   HENT:   Head:    4.5 cm laceration to the left eyebrow with moderate gap, no active bleeding, moderate left periorbital ecchymosis and swelling with mild tenderness.    Mouth/Throat:   Oropharynx is clear and moist. No oropharyngeal exudate.   Eyes:    Pupils are equal, round, and reactive to light. EOMI. No subconjunctival hemorrhage. No " high Hyphema.   Neck:    Nontender neck. Normal range of motion. Neck supple.      No tracheal deviation present. No thyromegaly present.   Cardiovascular:  Normal rate, regular rhythm, no murmur   Pulmonary/Chest: Breath sounds are clear and equal without wheezes or crackles.  Abdominal:   Soft. Bowel sounds are normal. Exhibits no distension and      no mass. There is no tenderness.      There is no rebound and no guarding.   Musculoskeletal:  Exhibits no edema. Arms and legs are nontender. Back nontender.   Lymphadenopathy:  No cervical adenopathy.   Neurological:   Alert and oriented to person, place, and time. GCS 15.  CN 2-12 intact.  and proximal upper extremity strength strong and equal.  Bilateral lower extremity strength strong and equal, including strong dorsiflexion and plantarflexion strength.  Sensation intact and equal to the face, arms and legs.  No facial droop or weakness. Normal speech.  Follows commands and answers questions normally.    Skin:    Skin is warm and dry. No rash noted. No pallor.       Emergency Department Course   ECG:  ECG results from 04/18/24   EKG 12-lead, tracing only     Value    Systolic Blood Pressure     Diastolic Blood Pressure     Ventricular Rate 57    Atrial Rate 57    ID Interval 184    QRS Duration 98        QTc 451    P Axis 69    R AXIS 72    T Axis 71    Interpretation ECG      Sinus bradycardia with marked sinus arrhythmia  Otherwise normal ECG  When compared with ECG of 11-APR-2022 10:37,  Premature atrial complexes are no longer Present  Confirmed by GENERATED REPORT, COMPUTER ([id number]),  Becky Soto ([id number]) on 4/18/2024 11:00:13 AM         Imaging:  XR Hand Right G/E 3 Views   Final Result   IMPRESSION: Bones are demineralized and there is mild scattered   arthritic changes of the right hand. No evidence of an acute fracture   with attention to the MCP joint region.      JEAN CLAUDE TURNER MD            SYSTEM ID:  [id number]      CT Orbits wo  Contrast   Final Result   IMPRESSION:      1. No evidence of acute intracranial hemorrhage, mass, or herniation.    2. No calvarial or facial bone fracture.   3. Left periorbital/zygomatic hematoma and edema.      ANABELLA HOFF MD            SYSTEM ID:  [id number]      CT Head w/o Contrast   Final Result   IMPRESSION:      1. No evidence of acute intracranial hemorrhage, mass, or herniation.    2. No calvarial or facial bone fracture.   3. Left periorbital/zygomatic hematoma and edema.      ANABELLA HOFF MD            SYSTEM ID:  [id number]         Results per radiology     Laboratory:  Labs Ordered and Resulted from Time of ED Arrival to Time of ED Departure   INR - Abnormal       Result Value    INR 2.05 (*)    CBC WITH PLATELETS AND DIFFERENTIAL - Abnormal    WBC Count 7.0      RBC Count 4.67      Hemoglobin 13.9      Hematocrit 41.8      MCV 90      MCH 29.8      MCHC 33.3      RDW 13.2      Platelet Count 155      % Neutrophils 79      % Lymphocytes 8      % Monocytes 8      % Eosinophils 4      % Basophils 1      % Immature Granulocytes 0      NRBCs per 100 WBC 0      Absolute Neutrophils 5.6      Absolute Lymphocytes 0.6 (*)     Absolute Monocytes 0.5      Absolute Eosinophils 0.3      Absolute Basophils 0.0      Absolute Immature Granulocytes 0.0      Absolute NRBCs 0.0     BASIC METABOLIC PANEL - Normal    Sodium 140      Potassium 4.3      Chloride 105      Carbon Dioxide (CO2) 28      Anion Gap 7      Urea Nitrogen 19.5      Creatinine 1.15      GFR Estimate 67      Calcium 8.9      Glucose 99          Procedure:      Laceration Repair 1     Procedure: Laceration Repair    Indication: Laceration    Consent: Verbal    Location: Left Eyebrow    Length: 5.0 cm    Preparation: Irrigation with Sterile Saline.    Anesthesia/Sedation: 0.5% Marcaine       Treatment/Exploration: Wound explored, no foreign bodies found     Closure: The wound was closed with two layers. Skin/superficial layer was closed with 9 x  5-0 Polypropylene (prolene)  using Interrupted sutures. Subcutaneous layer was closed with 4 x 5-0 Vicryl using Interrupted sutures.     Patient Status: The patient tolerated the procedure well: Yes. There were no complications.      Laceration Repair 2     Procedure: Laceration Repair    Indication: Laceration    Consent: Verbal    Location: Left Zygomatic    Length: 1.0 cm    Preparation: Irrigation with Sterile Saline.    Anesthesia/Sedation: None      Treatment/Exploration: Wound explored, no foreign bodies found     Closure: The wound was closed with Dermabond    Patient Status: The patient tolerated the procedure well: Yes. There were no complications.     Emergency Department Course & Assessments:    Interventions:  Medications   acetaminophen (TYLENOL) tablet 650 mg (650 mg Oral $Given 4/18/24 0848)   sodium chloride 0.9% BOLUS 1,000 mL (0 mLs Intravenous Stopped 4/18/24 1110)   Tdap (tetanus-diphtheria-acell pertussis) (ADACEL) injection 0.5 mL (0.5 mLs Intramuscular $Given 4/18/24 0914)      Assessments:  0812 I obtained history and examined the patient as noted above.  1030 I rechecked the patient and performed the laceration repairs as noted above.    Independent Interpretation (X-rays, CTs, rhythm strip):  None    Consultations/Discussion of Management or Tests:  I independently reviewed the hand x-ray and note no visible fracture of hands  I independently reviewed the head CT and note intercranial bleed or skull fracture.     Social Determinants of Health affecting care:   None    Disposition:  The patient was discharged.     Impression & Plan    CMS Diagnoses: None    Medical Decision Making:  I found this patient had a mechanical fall due balance problems caused by Parkinson's disease with Lewy body dementia.  He sustained a closed head injury with concussion without any sign of intracranial bleed or skull fracture.  He is at his baseline mental status and neurologic condition currently.  He has left  orbital soft tissue contusion and lacerations but no sign of fracture, retro-orbital hematoma, hyphema or vision changes.  CT scan of his orbit showed soft tissue swelling but no serious injury.  I repaired his left eyebrow laceration with sutures and he was told to have sutures removed in 7 days.  I repaired the smaller facial laceration with Dermabond.  His cervical spine was cleared clinically.  He has a right hand contusion but no sign of fracture on x-rays.  I felt he could be safely discharged home with his wife and she was instructed return him here if he develops any headache, vomiting, confusion or other concerning problems.  He is anticoagulant with Coumadin with a therapeutic INR of 2.05 so he was told to call Coumadin clinic to guide his dosing.  Considering there is no intracranial bleed or sign of active bleeding, he can resume his normal Coumadin dosing.      Diagnosis:    ICD-10-CM    1. Closed head injury with concussion, without loss of consciousness, initial encounter  S06.0X0A       2. Contusion of left orbital tissues, initial encounter  S05.12XA       3. Facial laceration, initial encounter  S01.81XA       4. Parkinson's disease with dyskinesia, unspecified whether manifestations fluctuate (H)  G20.B1       5. Anticoagulated on Coumadin  Z79.01            Discharge Medications:  Discharge Medication List as of 4/18/2024 11:10 AM           Scribe Disclosure:  I, Dereje Pimentel, am serving as a scribe at 8:40 AM on 4/18/2024 to document services personally performed by Мария Lopez MD based on my observations and the provider's statements to me.   4/18/2024   Мария Lopez MD [place], Cheri Lee, MD  04/18/24 2504

## 2024-04-18 NOTE — DISCHARGE INSTRUCTIONS
Apply ice to your facial swelling off and on X 2 days    Clean wounds twice daily and apply antibiotic ointment and a new dressing.

## 2025-07-03 ENCOUNTER — APPOINTMENT (OUTPATIENT)
Dept: URBAN - METROPOLITAN AREA CLINIC 256 | Age: 75
Setting detail: DERMATOLOGY
End: 2025-07-03

## 2025-07-03 VITALS — WEIGHT: 130 LBS | HEIGHT: 69 IN

## 2025-07-03 DIAGNOSIS — R21 RASH AND OTHER NONSPECIFIC SKIN ERUPTION: ICD-10-CM

## 2025-07-03 DIAGNOSIS — L72.8 OTHER FOLLICULAR CYSTS OF THE SKIN AND SUBCUTANEOUS TISSUE: ICD-10-CM

## 2025-07-03 PROCEDURE — OTHER COUNSELING: OTHER

## 2025-07-03 PROCEDURE — OTHER MIPS QUALITY: OTHER

## 2025-07-03 PROCEDURE — OTHER PHOTO-DOCUMENTATION: OTHER

## 2025-07-03 PROCEDURE — OTHER PATIENT SPECIFIC COUNSELING: OTHER

## 2025-07-03 ASSESSMENT — LOCATION SIMPLE DESCRIPTION DERM: LOCATION SIMPLE: LEFT CHEEK

## 2025-07-03 ASSESSMENT — LOCATION DETAILED DESCRIPTION DERM
LOCATION DETAILED: LEFT LATERAL SUBMANDIBULAR CHEEK
LOCATION DETAILED: LEFT INFERIOR CENTRAL MALAR CHEEK

## 2025-07-03 ASSESSMENT — LOCATION ZONE DERM: LOCATION ZONE: FACE
